# Patient Record
Sex: FEMALE | Race: WHITE | Employment: OTHER | URBAN - METROPOLITAN AREA
[De-identification: names, ages, dates, MRNs, and addresses within clinical notes are randomized per-mention and may not be internally consistent; named-entity substitution may affect disease eponyms.]

---

## 2017-01-10 ENCOUNTER — HOSPITAL ENCOUNTER (OUTPATIENT)
Dept: MAMMOGRAPHY | Age: 62
Discharge: HOME | End: 2017-01-10
Attending: INTERNAL MEDICINE
Payer: COMMERCIAL

## 2017-01-10 DIAGNOSIS — Z12.31 VISIT FOR SCREENING MAMMOGRAM: ICD-10-CM

## 2017-01-10 PROCEDURE — G0202 SCR MAMMO BI INCL CAD: HCPCS

## 2017-01-10 PROCEDURE — 77067 SCR MAMMO BI INCL CAD: CPT

## 2018-11-28 ENCOUNTER — HOSPITAL ENCOUNTER (OUTPATIENT)
Dept: MAMMOGRAPHY | Age: 63
Discharge: HOME | End: 2018-11-28
Attending: SPECIALIST
Payer: COMMERCIAL

## 2018-11-28 DIAGNOSIS — Z12.31 VISIT FOR SCREENING MAMMOGRAM: ICD-10-CM

## 2018-11-28 PROCEDURE — 77063 BREAST TOMOSYNTHESIS BI: CPT

## 2019-04-29 ENCOUNTER — HOSPITAL ENCOUNTER (OUTPATIENT)
Dept: MAMMOGRAPHY | Age: 64
Discharge: HOME | End: 2019-04-29
Payer: COMMERCIAL

## 2019-04-29 DIAGNOSIS — M85.80 OSTEOPENIA: ICD-10-CM

## 2019-04-29 PROCEDURE — 77080 DXA BONE DENSITY AXIAL: CPT

## 2020-06-16 ENCOUNTER — OFFICE VISIT (OUTPATIENT)
Dept: PRIMARY CARE CLINIC | Age: 65
End: 2020-06-16

## 2020-06-16 VITALS — HEART RATE: 64 BPM | OXYGEN SATURATION: 98 % | TEMPERATURE: 99.2 F

## 2020-06-16 DIAGNOSIS — Z01.818 PRE-OP EXAM: Primary | ICD-10-CM

## 2020-06-16 DIAGNOSIS — Z11.59 SPECIAL SCREENING EXAMINATION FOR UNSPECIFIED VIRAL DISEASE: ICD-10-CM

## 2020-06-16 NOTE — PROGRESS NOTES
Patient is being seen at the [facility]. Please see scanned documentation as well for further information. S:  Ms. Dulce Maria Gil presents for pre-op or pre-procedure testing for Covid 19. Patient has procedure or surgery by Dr. Cheo Whitfield scheduled for 6/23/20. Patient denies current Covid type symptoms. O:    Visit Vitals  Pulse 64   Temp 99.2 °F (37.3 °C) (Oral)   SpO2 98%     Alert and oriented  No acute distress, no increased work of breathing  Normocephalic, atraumatic  Skin color normal  Calm and cooperative  Voice clear, conversant without shortness of breath    A/P:  Pre-op, Pre-procedure exam    Covid 19 testing performed  Patient understands she will be contacted if the results are positive. Follow up prn.

## 2020-06-17 LAB — SARS-COV-2, NAA: NOT DETECTED

## 2020-06-23 ENCOUNTER — HOSPITAL ENCOUNTER (OUTPATIENT)
Age: 65
Setting detail: OUTPATIENT SURGERY
Discharge: HOME | End: 2020-06-23
Attending: INTERNAL MEDICINE | Admitting: INTERNAL MEDICINE
Payer: MEDICARE

## 2020-06-23 ENCOUNTER — ANESTHESIA EVENT (OUTPATIENT)
Dept: ENDOSCOPY | Age: 65
End: 2020-06-23
Payer: MEDICARE

## 2020-06-23 ENCOUNTER — ANESTHESIA (OUTPATIENT)
Dept: ENDOSCOPY | Age: 65
End: 2020-06-23
Payer: MEDICARE

## 2020-06-23 VITALS
HEART RATE: 60 BPM | TEMPERATURE: 97.8 F | DIASTOLIC BLOOD PRESSURE: 86 MMHG | SYSTOLIC BLOOD PRESSURE: 146 MMHG | WEIGHT: 120 LBS | RESPIRATION RATE: 11 BRPM | BODY MASS INDEX: 19.29 KG/M2 | OXYGEN SATURATION: 96 % | HEIGHT: 66 IN

## 2020-06-23 PROCEDURE — 76040000007: Performed by: INTERNAL MEDICINE

## 2020-06-23 PROCEDURE — 88305 TISSUE EXAM BY PATHOLOGIST: CPT

## 2020-06-23 PROCEDURE — 74011000250 HC RX REV CODE- 250: Performed by: NURSE ANESTHETIST, CERTIFIED REGISTERED

## 2020-06-23 PROCEDURE — 77030013992 HC SNR POLYP ENDOSC BSC -B: Performed by: INTERNAL MEDICINE

## 2020-06-23 PROCEDURE — 74011250636 HC RX REV CODE- 250/636: Performed by: INTERNAL MEDICINE

## 2020-06-23 PROCEDURE — 77030021593 HC FCPS BIOP ENDOSC BSC -A: Performed by: INTERNAL MEDICINE

## 2020-06-23 PROCEDURE — 74011250637 HC RX REV CODE- 250/637: Performed by: INTERNAL MEDICINE

## 2020-06-23 PROCEDURE — 76060000032 HC ANESTHESIA 0.5 TO 1 HR: Performed by: INTERNAL MEDICINE

## 2020-06-23 PROCEDURE — 74011250636 HC RX REV CODE- 250/636: Performed by: NURSE ANESTHETIST, CERTIFIED REGISTERED

## 2020-06-23 RX ORDER — SODIUM CHLORIDE 9 MG/ML
INJECTION, SOLUTION INTRAVENOUS
Status: DISCONTINUED | OUTPATIENT
Start: 2020-06-23 | End: 2020-06-23 | Stop reason: HOSPADM

## 2020-06-23 RX ORDER — SODIUM CHLORIDE 0.9 % (FLUSH) 0.9 %
5-40 SYRINGE (ML) INJECTION AS NEEDED
Status: DISCONTINUED | OUTPATIENT
Start: 2020-06-23 | End: 2020-06-23 | Stop reason: HOSPADM

## 2020-06-23 RX ORDER — ATROPINE SULFATE 0.1 MG/ML
0.5 INJECTION INTRAVENOUS
Status: DISCONTINUED | OUTPATIENT
Start: 2020-06-23 | End: 2020-06-23 | Stop reason: HOSPADM

## 2020-06-23 RX ORDER — LIDOCAINE HYDROCHLORIDE 20 MG/ML
INJECTION, SOLUTION EPIDURAL; INFILTRATION; INTRACAUDAL; PERINEURAL AS NEEDED
Status: DISCONTINUED | OUTPATIENT
Start: 2020-06-23 | End: 2020-06-23 | Stop reason: HOSPADM

## 2020-06-23 RX ORDER — MIDAZOLAM HYDROCHLORIDE 1 MG/ML
.25-5 INJECTION, SOLUTION INTRAMUSCULAR; INTRAVENOUS
Status: DISCONTINUED | OUTPATIENT
Start: 2020-06-23 | End: 2020-06-23 | Stop reason: HOSPADM

## 2020-06-23 RX ORDER — SODIUM CHLORIDE 9 MG/ML
150 INJECTION, SOLUTION INTRAVENOUS CONTINUOUS
Status: DISCONTINUED | OUTPATIENT
Start: 2020-06-23 | End: 2020-06-23 | Stop reason: HOSPADM

## 2020-06-23 RX ORDER — PROPOFOL 10 MG/ML
INJECTION, EMULSION INTRAVENOUS AS NEEDED
Status: DISCONTINUED | OUTPATIENT
Start: 2020-06-23 | End: 2020-06-23 | Stop reason: HOSPADM

## 2020-06-23 RX ORDER — EPINEPHRINE 0.1 MG/ML
1 INJECTION INTRACARDIAC; INTRAVENOUS
Status: DISCONTINUED | OUTPATIENT
Start: 2020-06-23 | End: 2020-06-23 | Stop reason: HOSPADM

## 2020-06-23 RX ORDER — DIPHENHYDRAMINE HYDROCHLORIDE 50 MG/ML
INJECTION, SOLUTION INTRAMUSCULAR; INTRAVENOUS AS NEEDED
Status: DISCONTINUED | OUTPATIENT
Start: 2020-06-23 | End: 2020-06-23 | Stop reason: HOSPADM

## 2020-06-23 RX ORDER — LORAZEPAM 1 MG/1
1 TABLET ORAL
COMMUNITY

## 2020-06-23 RX ORDER — SODIUM CHLORIDE 0.9 % (FLUSH) 0.9 %
5-40 SYRINGE (ML) INJECTION EVERY 8 HOURS
Status: DISCONTINUED | OUTPATIENT
Start: 2020-06-23 | End: 2020-06-23 | Stop reason: HOSPADM

## 2020-06-23 RX ORDER — DIPHENHYDRAMINE HYDROCHLORIDE 50 MG/ML
INJECTION, SOLUTION INTRAMUSCULAR; INTRAVENOUS
Status: COMPLETED
Start: 2020-06-23 | End: 2020-06-23

## 2020-06-23 RX ORDER — FENTANYL CITRATE 50 UG/ML
200 INJECTION, SOLUTION INTRAMUSCULAR; INTRAVENOUS
Status: DISCONTINUED | OUTPATIENT
Start: 2020-06-23 | End: 2020-06-23 | Stop reason: HOSPADM

## 2020-06-23 RX ORDER — DEXTROMETHORPHAN/PSEUDOEPHED 2.5-7.5/.8
1.2 DROPS ORAL
Status: DISCONTINUED | OUTPATIENT
Start: 2020-06-23 | End: 2020-06-23 | Stop reason: HOSPADM

## 2020-06-23 RX ADMIN — DIPHENHYDRAMINE HYDROCHLORIDE 25 MG: 50 INJECTION, SOLUTION INTRAMUSCULAR; INTRAVENOUS at 11:06

## 2020-06-23 RX ADMIN — SODIUM CHLORIDE: 900 INJECTION, SOLUTION INTRAVENOUS at 10:44

## 2020-06-23 RX ADMIN — LIDOCAINE HYDROCHLORIDE 100 MG: 20 INJECTION, SOLUTION EPIDURAL; INFILTRATION; INTRACAUDAL; PERINEURAL at 10:47

## 2020-06-23 RX ADMIN — PROPOFOL 300 MG: 10 INJECTION, EMULSION INTRAVENOUS at 11:18

## 2020-06-23 NOTE — ANESTHESIA PREPROCEDURE EVALUATION
Relevant Problems   No relevant active problems       Anesthetic History   No history of anesthetic complications            Review of Systems / Medical History  Patient summary reviewed, nursing notes reviewed and pertinent labs reviewed    Pulmonary  Within defined limits                 Neuro/Psych         Psychiatric history     Cardiovascular            Dysrhythmias : SVT  Hyperlipidemia  Pertinent negatives: No angina  Exercise tolerance: >4 METS     GI/Hepatic/Renal  Within defined limits              Endo/Other  Within defined limits           Other Findings            Physical Exam    Airway  Mallampati: I  TM Distance: 4 - 6 cm  Neck ROM: normal range of motion   Mouth opening: Normal     Cardiovascular  Regular rate and rhythm,  S1 and S2 normal,  no murmur, click, rub, or gallop             Dental  No notable dental hx       Pulmonary  Breath sounds clear to auscultation               Abdominal  Abdominal exam normal       Other Findings            Anesthetic Plan    ASA: 2  Anesthesia type: MAC          Induction: Intravenous  Anesthetic plan and risks discussed with: Patient

## 2020-06-23 NOTE — H&P
[address]  [address] [facility], [address] [place] [address]          Pre-procedure History and Physical       NAME:  Frankie Cooney   :   1955   MRN:   [id number]     CHIEF COMPLAINT/HPI: See procedure note    PMH:  Past Medical History:   Diagnosis Date    Hyperlipidemia 2011    OCD (obsessive compulsive disorder) 2011    PSVT (paroxysmal supraventricular tachycardia) ([address] 2011       PSH:  Past Surgical History:   Procedure Laterality Date    HX BREAST BIOPSY Right ?    benign    HX  SECTION         Allergies: Allergies   Allergen Reactions    Pcn [Penicillins] Rash    Sulfa (Sulfonamide Antibiotics) Rash       Home Medications:  Prior to Admission Medications   Prescriptions Last Dose Informant Patient Reported? Taking? Cholecalciferol, Vitamin D3, (VITAMIN D3) 1,000 unit cap 2020 at Unknown time  Yes Yes   Sig: Take 2,000 Units by mouth daily. LORazepam (ATIVAN) 1 mg tablet 2020 at Unknown time  Yes Yes   Sig: Take 1 mg by mouth every four (4) hours as needed for Anxiety. MULTIVIT WITH CALCIUM,IRON,MIN (WOMEN'S DAILY MULTIVITAMIN PO) 2020 at Unknown time  Yes Yes   Sig: Take 1 tablet by mouth daily. PARoxetine (PAXIL) 20 mg tablet 2020 at Unknown time  No Yes   Sig: TAKE ONE AND ONE-HALF TABLET BY MOUTH DAILY   aspirin delayed-release 81 mg tablet 2020 at Unknown time  Yes Yes   Sig: Take 162 mg by mouth daily. diazePAM (VALIUM) 5 mg tablet Not Taking at Unknown time  No No   Sig: TAKE ONE TABLET BY MOUTH EVERY EVENING   digoxin (LANOXIN) 0.125 mg tablet 2020 at Unknown time  No Yes   Sig: TAKE ONE TABLET BY MOUTH DAILY   fish oil-dha-epa 1,200-144-216 mg cap 2020 at Unknown time  Yes Yes   Sig: Take 1 tablet by mouth four (4) times daily.    simvastatin (ZOCOR) 20 mg tablet 2020 at Unknown time  No Yes   Sig: TAKE ONE TABLET BY MOUTH EVERY NIGHT AT BEDTIME   timolol maleate 5 mg tablet 2020 at Unknown time  No Yes   Sig: TAKE ONE TABLET BY MOUTH DAILY      Facility-Administered Medications: None       Hospital Medications:  Current Facility-Administered Medications   Medication Dose Route Frequency    0.9% sodium chloride infusion  150 mL/hr IntraVENous CONTINUOUS    sodium chloride (NS) flush 5-40 mL  5-40 mL IntraVENous Q8H    sodium chloride (NS) flush 5-40 mL  5-40 mL IntraVENous PRN    midazolam (VERSED) injection 0.25-5 mg  0.25-5 mg IntraVENous Multiple    fentaNYL citrate (PF) injection 200 mcg  200 mcg IntraVENous Multiple    simethicone (MYLICON) 01UA/8.0GP oral drops 80 mg  1.2 mL Oral Multiple    atropine injection 0.5 mg  0.5 mg IntraVENous ONCE PRN    EPINEPHrine (ADRENALIN) 0.1 mg/mL syringe 1 mg  1 mg Endoscopically ONCE PRN     Facility-Administered Medications Ordered in Other Encounters   Medication Dose Route Frequency    lidocaine (PF) (XYLOCAINE) 20 mg/mL (2 %) injection   IntraVENous PRN       Family History:  History reviewed. No pertinent family history. Social History:  Social History     Tobacco Use    Smoking status: Former Smoker   Substance Use Topics    Alcohol use: Yes       The patient was counseled at length about the risks of brandie Covid-19 in the tenzin-operative and post-operative states including the recovery window of their procedure. The patient was made aware that brandie Covid-19 after a surgical procedure may worsen their prognosis for recovering from the virus and lend to a higher morbidity and or mortality risk. The patient was given the options of postponing their procedure. All of the risks, benefits, and alternatives were discussed. The patient does  wish to proceed with the procedure. PHYSICAL EXAM PRIOR TO SEDATION:  General: Alert, in no acute distress    Lungs:            CTA bilaterally  Heart:  Normal S1, S2    Abdomen: Soft, Non distended, Non tender. Normoactive bowel sounds.       Assessment:   Stable for sedation administration.   Date of last colonoscopy: 11 yrs, Polyps  No    Plan:     · Endoscopic procedure with sedation     Signed By: Liborio Holman MD     6/23/2020  10:48 AM

## 2020-06-23 NOTE — PERIOP NOTES

## 2020-06-23 NOTE — ROUTINE PROCESS
Elías   1955  [id number]    Situation:  Verbal report received from: Ana María  Procedure: Procedure(s):  COLONOSCOPY,EGD  ESOPHAGOGASTRODUODENOSCOPY (EGD) :-  ESOPHAGOGASTRODUODENAL (EGD) BIOPSY  ENDOSCOPIC POLYPECTOMY    Background:    Preoperative diagnosis: SCREENING,  GERD  Postoperative diagnosis: 1.gastritis  2.esophageal polyp  3. cecal polyps x 2    :  Dr. Gagan Benjamin  Assistant(s): Endoscopy Technician-1: Yesi Cleveland  Endoscopy RN-1: Jeanette Franco    Specimens:   ID Type Source Tests Collected by Time Destination   1 : duodenal bx r/o celiac Preservative Duodenum  Kendell Garcia MD 6/23/2020 1052 Pathology   2 : gastric bx r/o h pylori Preservative Gastric  Kendell Garcia MD 6/23/2020 1054 Pathology   3 : esophageal polyp Preservative   Kendell Garcia MD 6/23/2020 1056 Pathology   4 : cecal polyps Preservative Cecum  Kendell Garcia MD 6/23/2020 1108 Pathology     H. Pylori      Assessment:  Intra-procedure medications     Anesthesia gave intra-procedure sedation and medications, see anesthesia flow sheet yes    Intravenous fluids: NS@ KVO     Vital signs stable yes    Abdominal assessment: round and soft yes    Recommendation:  Discharge patient per MD order yes  Return to floor  Family or Friend yes  Permission to share finding with family or friend yes

## 2020-06-23 NOTE — PROCEDURES
Sin Khan Jared Guerra M.D.  [address] [place] [address]  [phone]               Colonoscopy Procedure Note    NAME: Jimmy Hoyos  :  1955  MRN:  [id number]    Indications:   Screening colonoscopy     : Angelkia Iglesias MD    Referring Provider:  Matthew Hurtado MD    Surgical Assistant: none    Prosthetic devices, grafts, tissues, transplant, or devices implanted: none    Medicines:  MAC anesthesia      Procedure Details:  After informed consent was obtained with all risks and benefits of the procedure explained and preprocedure exam completed, the patient was placed in the left lateral decubitus position. Universal protocol for patient identification was performed and documented in the nursing notes. Throughout the procedure, the patient's blood pressure was monitored at least every five minutes; pulse, and oxygen saturations were monitored continuously. All vital signs were documented in the nursing notes. A digital rectal exam was performed and was normal.  The Olympus videocolonoscope  was inserted in the rectum and carefully advanced to the cecum, which was identified by the ileocecal valve and appendiceal orifice. The colonoscope was slowly withdrawn with careful evaluation between folds. Retroflexion in the rectum was performed; findings and interventions are described below. Procedure start time, extent reached time/cecum time, and procedure end time are documented in the nursing notes. The quality of preparation was good.        Findings:   Rectum: normal  Sigmoid: normal  Descending Colon: normal  Transverse Colon: normal  Ascending Colon: normal  Cecum: 2  Sessile polyp(s), the largest 5 mm in size; s/p cold snare polypectomy    Interventions:    2 complete polypectomy were performed using cold snare  and the polyps were  retrieved    Specimens:   ID Type Source Tests Collected by Time Destination   1 : duodenal bx r/o celiac Preservative Duodenum  Ronald Mc MD 6/23/2020 1052 Pathology   2 : gastric bx r/o h pylori Preservative Gastric  Ronald Mc MD 6/23/2020 1054 Pathology   3 : esophageal polyp Preservative   Ronald Mc MD 6/23/2020 1056 Pathology   4 : cecal polyps Preservative Cecum  Ronald Mc MD 6/23/2020 1108 Pathology       EBL:  None. Complications:   No immediate complications     Impression:  -Benign appearing colon polyps, removed as above. Recommendations:   -If adenoma is present, repeat colonoscopy in 5 years. If < 10 years, reason:  above average risk patient    Resume normal medication(s). Signed by:  Deepali Monson MD          6/23/2020  11:35 AM

## 2020-06-23 NOTE — PROCEDURES
[facility] [address] Taras Veloz M.D.  [place], [facility]  [phone]               Esophagogastroduodenoscopy (EGD) Procedure Note    NAME: Leanne Habermann  :  1955  MRN:  [id number]    Indications:  GERD; dyspepsia     : Polly Valdez MD    Referring Provider:  Constance Triplett MD    Surgical Assistant: none    Prosthetic devices, grafts, tissues, transplant, or devices implanted: none    Medicine:  MAC anesthesia      Procedure Details:  After informed consent was obtained with all risks and benefits of the procedure explained and preprocedure exam completed, the patient was placed in the left lateral decubitus position. Universal protocol for patient identification was performed and documented in the nursing notes. Throughout the procedure, the patient's blood pressure was monitored at least every five minutes; pulse, and oxygen saturations were monitored continuously. All vital signs were documented in the nursing notes. The endoscope was inserted into the mouth and advanced under direct vision to second portion of the duodenum. A careful inspection was made as the gastroscope was withdrawn, including a retroflexed view of the proximal stomach; findings and interventions are described below.       Findings:   Esophagus: 4 mm white polyp in the distal esophagus s/p cold forceps polypectomy  Stomach: mild linear erythema in the antrum s/p biopsies throughout the stomach  Duodenum: normal s/p biopsies for celiac disease    Interventions:    biopsy of esophagus, stomach, and duodenum    Specimens:   ID Type Source Tests Collected by Time Destination   1 : duodenal bx r/o celiac Preservative Duodenum  Jan Drew MD 2020 1052 Pathology   2 : gastric bx r/o h pylori Preservative Gastric  Jan Drew MD 2020 1054 Pathology   3 : esophageal polyp Preservative   Jan Drew MD 2020 1056 Pathology   4 : cecal polyps Preservative Cecum  Yudelka Rose MD 6/23/2020 1108 Pathology        EBL: None          Complications:     No immediate complications        Impression:  -See post-procedure diagnoses. Recommendations:  -Await pathology. Signed by:  Mimi Garcia MD         6/23/2020 11:30 AM

## 2020-06-23 NOTE — ANESTHESIA POSTPROCEDURE EVALUATION
Post-Anesthesia Evaluation and Assessment    Patient: Arcadio Manzo MRN: [id number]  SSN: [id number]    YOB: 1955  Age: 71 y.o. Sex: female      I have evaluated the patient and they are stable and ready for discharge from the PACU. Cardiovascular Function/Vital Signs  Visit Vitals  /86   Pulse 60   Temp 36.6 °C (97.8 °F)   Resp 11   Ht 5' 6\" (1.676 m)   Wt 54.4 kg (120 lb)   SpO2 96%   Breastfeeding No   BMI 19.37 kg/m²       Patient is status post MAC anesthesia for Procedure(s):  COLONOSCOPY,EGD  ESOPHAGOGASTRODUODENOSCOPY (EGD) :-  ESOPHAGOGASTRODUODENAL (EGD) BIOPSY  ENDOSCOPIC POLYPECTOMY. Nausea/Vomiting: None    Postoperative hydration reviewed and adequate. Pain:  Pain Scale 1: Visual (06/23/20 1134)  Pain Intensity 1: 0 (06/23/20 1134)   Managed    Neurological Status: At baseline    Mental Status, Level of Consciousness: Alert and  oriented to person, place, and time    Pulmonary Status:   O2 Device: Room air (06/23/20 1134)   Adequate oxygenation and airway patent    Complications related to anesthesia: None    Post-anesthesia assessment completed. No concerns    Signed By: Deysi Bell MD     June 23, 2020              Procedure(s):  COLONOSCOPY,EGD  ESOPHAGOGASTRODUODENOSCOPY (EGD) :-  ESOPHAGOGASTRODUODENAL (EGD) BIOPSY  ENDOSCOPIC POLYPECTOMY. MAC    <[email or web address]>    INITIAL Post-op Vital signs:   Vitals Value Taken Time   BP 0/0 6/23/2020 11:54 AM   Temp 36.6 °C (97.8 °F) 6/23/2020 11:23 AM   Pulse 0 6/23/2020 11:55 AM   Resp 0 6/23/2020 12:07 PM   SpO2 0 % 6/23/2020 11:55 AM   Vitals shown include unvalidated device data.

## 2020-06-23 NOTE — DISCHARGE INSTRUCTIONS
[place] [address] Tosha Summers M.D.  [address] [place] [address], [address] [place] [address]  [phone]                      EGD/COLONOSCOPY DISCHARGE INSTRUCTIONS    Jeremias Garcia  [id number]  1955    DISCOMFORT:  Redness at IV site- apply warm compress to area; if redness or soreness persist- contact your physician  There may be a slight amount of blood passed from the rectum  Gaseous discomfort- walking, belching will help relieve any discomfort  You may not operate a vehicle for 12 hours  You may not  engage in an occupation involving machinery or appliances for rest of today  You may not  drink alcoholic beverages for at least 12 hours  Avoid making any critical decisions for at least 24 hour    DIET:   You may resume your normal diet, but some patients find that heavy or large  meals may lead to indigestion or vomiting. I suggest a light meal as first food  Intake. I recommend a whole food, plant-based diet for your overall health. ACTIVITY:  You may resume your normal daily activities. It is recommended that you spend the remainder of the day resting - avoid any strenuous activity. CALL M.D. ANY SIGN OF   Increasing pain, nausea, vomiting  Abdominal distension (swelling)  New increased bleeding (oral or rectal)  Fever (chills)  Pain in chest area  Bloody discharge from nose or mouth  Shortness of breath    Additional Instructions:   Call Dr. Tosha Summers if any questions or problems at [phone]   Setup follow-up office visit in 4 weeks   Should have a repeat colonoscopy in 5-10 years based on pathology. EGD showed small esophagus polyp removed and mild stomach redness. Colonoscopy showed 2 small polyps removed. PPI daily. Pepcid 20 mg BID. Learning About Coronavirus [phone])  Coronavirus [phone]): Overview  What is coronavirus (COVID-19)? The coronavirus disease (COVID-19) is caused by a virus.  It is an illness that was first found in [place], [place], in December 2019. It has since spread worldwide. The virus can cause fever, cough, and trouble breathing. In severe cases, it can cause pneumonia and make it hard to breathe without help. It can cause death. Coronaviruses are a large group of viruses. They cause the common cold. They also cause more serious illnesses like Middle East respiratory syndrome (MERS) and severe acute respiratory syndrome (SARS). COVID-19 is caused by a novel coronavirus. That means it's a new type that has not been seen in people before. This virus spreads person-to-person through droplets from coughing and sneezing. It can also spread when you are close to someone who is infected. And it can spread when you touch something that has the virus on it, such as a doorknob or a tabletop. What can you do to protect yourself from coronavirus (COVID-19)? The best way to protect yourself from getting sick is to:  · Avoid areas where there is an outbreak. · Avoid contact with people who may be infected. · Wash your hands often with soap or alcohol-based hand sanitizers. · Avoid crowds and try to stay at least 6 feet away from other people. · Wash your hands often, especially after you cough or sneeze. Use soap and water, and scrub for at least 20 seconds. If soap and water aren't available, use an alcohol-based hand . · Avoid touching your mouth, nose, and eyes. What can you do to avoid spreading the virus to others? To help avoid spreading the virus to others:  · Cover your mouth with a tissue when you cough or sneeze. Then throw the tissue in the trash. · Use a disinfectant to clean things that you touch often. · Stay home if you are sick or have been exposed to the virus. Don't go to school, work, or public areas. And don't use public transportation. · If you are sick:  ? Leave your home only if you need to get medical care. But call the doctor's office first so they know you're coming. And wear a face mask, if you have one.  ?  If you have a face mask, wear it whenever you're around other people. It can help stop the spread of the virus when you cough or sneeze. ? Clean and disinfect your home every day. Use household  and disinfectant wipes or sprays. Take special care to clean things that you grab with your hands. These include doorknobs, remote controls, phones, and handles on your refrigerator and microwave. And don't forget countertops, tabletops, bathrooms, and computer keyboards. When to call for help  Call 911 anytime you think you may need emergency care. For example, call if:  · You have severe trouble breathing. (You can't talk at all.)  · You have constant chest pain or pressure. · You are severely dizzy or lightheaded. · You are confused or can't think clearly. · Your face and lips have a blue color. · You pass out (lose consciousness) or are very hard to wake up. Call your doctor now if you develop symptoms such as:  · Shortness of breath. · Fever. · Cough. If you need to get care, call ahead to the doctor's office for instructions before you go. Make sure you wear a face mask, if you have one, to prevent exposing other people to the virus. Where can you get the latest information? The following health organizations are tracking and studying this virus. Their websites contain the most up-to-date information. Tessa Mary Carmen also learn what to do if you think you may have been exposed to the virus. · U.S. Centers for Disease Control and Prevention (CDC): The CDC provides updated news about the disease and travel advice. The website also tells you how to prevent the spread of infection. www.cdc.gov  · World Health Organization [facility]): WHO offers information about the virus outbreaks. WHO also has travel advice. www.who.int  Current as of: April 1, 2020               Content Version: 12.4  © 9804-4770 Healthwise, Incorporated.    Care instructions adapted under license by your healthcare professional. If you have questions about a medical condition or this instruction, always ask your healthcare professional. [place] [address] any warranty or liability for your use of this information.

## 2020-06-25 ENCOUNTER — HOSPITAL ENCOUNTER (EMERGENCY)
Age: 65
Discharge: HOME | End: 2020-06-25
Attending: STUDENT IN AN ORGANIZED HEALTH CARE EDUCATION/TRAINING PROGRAM | Admitting: EMERGENCY MEDICINE
Payer: MEDICARE

## 2020-06-25 ENCOUNTER — APPOINTMENT (OUTPATIENT)
Dept: GENERAL RADIOLOGY | Age: 65
End: 2020-06-25
Attending: STUDENT IN AN ORGANIZED HEALTH CARE EDUCATION/TRAINING PROGRAM
Payer: MEDICARE

## 2020-06-25 VITALS
OXYGEN SATURATION: 99 % | DIASTOLIC BLOOD PRESSURE: 79 MMHG | RESPIRATION RATE: 16 BRPM | SYSTOLIC BLOOD PRESSURE: 155 MMHG | TEMPERATURE: 98.8 F | HEART RATE: 57 BPM

## 2020-06-25 DIAGNOSIS — R07.89 OTHER CHEST PAIN: Primary | ICD-10-CM

## 2020-06-25 LAB
ALBUMIN SERPL-MCNC: 3.9 G/DL (ref 3.5–5)
ALBUMIN/GLOB SERPL: 1.1 {RATIO} (ref 1.1–2.2)
ALP SERPL-CCNC: 112 U/L (ref 45–117)
ALT SERPL-CCNC: 35 U/L (ref 12–78)
ANION GAP SERPL CALC-SCNC: 7 MMOL/L (ref 5–15)
AST SERPL-CCNC: 17 U/L (ref 15–37)
BASOPHILS # BLD: 0 K/UL (ref 0–0.1)
BASOPHILS NFR BLD: 0 % (ref 0–1)
BILIRUB SERPL-MCNC: 0.4 MG/DL (ref 0.2–1)
BUN SERPL-MCNC: 13 MG/DL (ref 6–20)
BUN/CREAT SERPL: 17 (ref 12–20)
CALCIUM SERPL-MCNC: 9.1 MG/DL (ref 8.5–10.1)
CHLORIDE SERPL-SCNC: 104 MMOL/L (ref 97–108)
CO2 SERPL-SCNC: 25 MMOL/L (ref 21–32)
COMMENT, HOLDF: NORMAL
CREAT SERPL-MCNC: 0.77 MG/DL (ref 0.55–1.02)
DIFFERENTIAL METHOD BLD: NORMAL
EOSINOPHIL # BLD: 0.2 K/UL (ref 0–0.4)
EOSINOPHIL NFR BLD: 2 % (ref 0–7)
ERYTHROCYTE [DISTWIDTH] IN BLOOD BY AUTOMATED COUNT: 12.3 % (ref 11.5–14.5)
GLOBULIN SER CALC-MCNC: 3.6 G/DL (ref 2–4)
GLUCOSE SERPL-MCNC: 142 MG/DL (ref 65–100)
HCT VFR BLD AUTO: 36.8 % (ref 35–47)
HGB BLD-MCNC: 12.4 G/DL (ref 11.5–16)
IMM GRANULOCYTES # BLD AUTO: 0 K/UL (ref 0–0.04)
IMM GRANULOCYTES NFR BLD AUTO: 0 % (ref 0–0.5)
LYMPHOCYTES # BLD: 3.2 K/UL (ref 0.8–3.5)
LYMPHOCYTES NFR BLD: 33 % (ref 12–49)
MCH RBC QN AUTO: 31.7 PG (ref 26–34)
MCHC RBC AUTO-ENTMCNC: 33.7 G/DL (ref 30–36.5)
MCV RBC AUTO: 94.1 FL (ref 80–99)
MONOCYTES # BLD: 0.7 K/UL (ref 0–1)
MONOCYTES NFR BLD: 7 % (ref 5–13)
NEUTS SEG # BLD: 5.7 K/UL (ref 1.8–8)
NEUTS SEG NFR BLD: 58 % (ref 32–75)
NRBC # BLD: 0 K/UL (ref 0–0.01)
NRBC BLD-RTO: 0 PER 100 WBC
PLATELET # BLD AUTO: 264 K/UL (ref 150–400)
PMV BLD AUTO: 9.3 FL (ref 8.9–12.9)
POTASSIUM SERPL-SCNC: 4 MMOL/L (ref 3.5–5.1)
PROT SERPL-MCNC: 7.5 G/DL (ref 6.4–8.2)
RBC # BLD AUTO: 3.91 M/UL (ref 3.8–5.2)
SAMPLES BEING HELD,HOLD: NORMAL
SODIUM SERPL-SCNC: 136 MMOL/L (ref 136–145)
TROPONIN I SERPL-MCNC: <0.05 NG/ML
WBC # BLD AUTO: 9.9 K/UL (ref 3.6–11)

## 2020-06-25 PROCEDURE — 99284 EMERGENCY DEPT VISIT MOD MDM: CPT

## 2020-06-25 PROCEDURE — 80053 COMPREHEN METABOLIC PANEL: CPT

## 2020-06-25 PROCEDURE — 71046 X-RAY EXAM CHEST 2 VIEWS: CPT

## 2020-06-25 PROCEDURE — 85025 COMPLETE CBC W/AUTO DIFF WBC: CPT

## 2020-06-25 PROCEDURE — 84484 ASSAY OF TROPONIN QUANT: CPT

## 2020-06-25 PROCEDURE — 93005 ELECTROCARDIOGRAM TRACING: CPT

## 2020-06-25 NOTE — ED NOTES
7: 07 AM  Change of shift. Care of patient taken over from Dr Hali Salas; H&P reviewed, bedside handoff complete. Awaiting Labs/ ; Pt has no c/o currently;     7:39 AM CP- cardiology follow-up; PCP already scheduled later thyis week;   [facility]  results have been reviewed with her. She has been counseled regarding her diagnosis. She verbally conveys understanding and agreement of the signs, symptoms, diagnosis, treatment and prognosis and additionally agrees to Call/ Arrange follow up as recommended with Dr. Jadiel Hansen MD in 24 - 48 hours. She also agrees with the care-plan and conveys that all of her questions have been answered. I have also put together some discharge instructions for her that include: 1) educational information regarding their diagnosis, 2) how to care for their diagnosis at home, as well a 3) list of reasons why they would want to return to the ED prior to their follow-up appointment, should their condition change or for concerns.

## 2020-06-25 NOTE — DISCHARGE INSTRUCTIONS
Patient Education        Chest Pain: Care Instructions  Your Care Instructions     There are many things that can cause chest pain. Some are not serious and will get better on their own in a few days. But some kinds of chest pain need more testing and treatment. Your doctor may have recommended a follow-up visit in the next 8 to 12 hours. If you are not getting better, you may need more tests or treatment. Even though your doctor has released you, you still need to watch for any problems. The doctor carefully checked you, but sometimes problems can develop later. If you have new symptoms or if your symptoms do not get better, get medical care right away. If you have worse or different chest pain or pressure that lasts more than 5 minutes or you passed out (lost consciousness), [id number] or seek other emergency help right away. A medical visit is only one step in your treatment. Even if you feel better, you still need to do what your doctor recommends, such as going to all suggested follow-up appointments and taking medicines exactly as directed. This will help you recover and help prevent future problems. How can you care for yourself at home? · Rest until you feel better. · Take your medicine exactly as prescribed. Call your doctor if you think you are having a problem with your medicine. · Do not drive after taking a prescription pain medicine. When should you call for help? [id number]:   · You passed out (lost consciousness). · You have severe difficulty breathing. · You have symptoms of a heart attack. These may include:  ? Chest pain or pressure, or a strange feeling in your chest.  ? Sweating. ? Shortness of breath. ? Nausea or vomiting. ? Pain, pressure, or a strange feeling in your back, neck, jaw, or upper belly or in one or both shoulders or arms. ? Lightheadedness or sudden weakness. ? A fast or irregular heartbeat.   After you call 911, the  may tell you to chew 1 adult-strength or 2 to 4 low-dose aspirin. Wait for an ambulance. Do not try to drive yourself. Call your doctor today if:   · You have any trouble breathing. · Your chest pain gets worse. · You are dizzy or lightheaded, or you feel like you may faint. · You are not getting better as expected. · You are having new or different chest pain. Where can you learn more? Go to http://michelle-angus.info/  Enter A120 in the search box to learn more about \"Chest Pain: Care Instructions. \"  Current as of: June 26, 2019               Content Version: 12.5  © 1948-2398 Healthwise, Incorporated. Care instructions adapted under license by [employer] (which disclaims liability or warranty for this information). If you have questions about a medical condition or this instruction, always ask your healthcare professional. [address] any warranty or liability for your use of this information.

## 2020-06-25 NOTE — ED PROVIDER NOTES
Chest Pain    This is a recurrent problem. The current episode started 1 to 2 hours ago. The problem has been gradually worsening. Duration of episode(s) is 2 hours. The problem occurs every several days. The pain is associated with normal activity and rest. The pain is present in the right side. The pain is at a severity of 2/10. The pain is mild. The quality of the pain is described as burning. The pain radiates to the upper back. Exacerbated by: nothing. Pertinent negatives include no abdominal pain, no cough, no fever, no leg pain, no shortness of breath and no vomiting. She has tried nothing for the symptoms. Past Medical History:   Diagnosis Date    Hyperlipidemia 2011    OCD (obsessive compulsive disorder) 2011    [facility] 2011       Past Surgical History:   Procedure Laterality Date    COLONOSCOPY Left 2020    COLONOSCOPY,EGD performed by Jim King MD at [facility] END[facility]COPY    HX BREAST BIOPSY Right ?    benign    HX  SECTION           History reviewed. No pertinent family history. Social History     Socioeconomic History    Marital status:      Spouse name: Not on file    Number of children: Not on file    Years of education: Not on file    Highest education level: Not on file   Occupational History    Not on file   Social Needs    Financial resource strain: Not on file    Food insecurity     Worry: Not on file     Inability: Not on file    Transportation needs     Medical: Not on file     Non-medical: Not on file   Tobacco Use    Smoking status: Former Smoker   Substance and Sexual Activity    Alcohol use: Yes     Comment: rarely    Drug use: Yes     Types: Marijuana     Comment: last used 3 weeks ago.      Sexual activity: Not on file   Lifestyle    Physical activity     Days per week: Not on file     Minutes per session: Not on file    Stress: Not on file   Relationships    Social connections Talks on phone: Not on file     Gets together: Not on file     Attends Protestant service: Not on file     Active member of club or organization: Not on file     Attends meetings of clubs or organizations: Not on file     Relationship status: Not on file    Intimate partner violence     Fear of current or ex partner: Not on file     Emotionally abused: Not on file     Physically abused: Not on file     Forced sexual activity: Not on file   Other Topics Concern    Not on file   Social History Narrative    Not on file         ALLERGIES: Pcn [penicillins] and Sulfa (sulfonamide antibiotics)    Review of Systems   Constitutional: Negative for fever. Respiratory: Negative for cough and shortness of breath. Cardiovascular: Positive for chest pain. Gastrointestinal: Negative for abdominal pain and vomiting. Skin: Negative for rash. All other systems reviewed and are negative. Vitals:    06/25/20 0639   BP: 155/79   Pulse: (!) 57   Resp: 16   Temp: 98.8 °F (37.1 °C)   SpO2: 99%            Physical Exam  Vitals signs and nursing note reviewed. Constitutional:       General: She is not in acute distress. Appearance: She is well-developed. HENT:      Head: Normocephalic and atraumatic. Eyes:      Conjunctiva/sclera: Conjunctivae normal.   Neck:      Musculoskeletal: Normal range of motion and neck supple. Cardiovascular:      Rate and Rhythm: Normal rate and regular rhythm. Pulmonary:      Effort: Pulmonary effort is normal. No respiratory distress. Chest:      Chest wall: No tenderness (No crepitus). Abdominal:      Palpations: Abdomen is soft. Tenderness: There is no abdominal tenderness. There is no guarding. Musculoskeletal: Normal range of motion. Skin:     General: Skin is warm and dry. Neurological:      Mental Status: She is alert and oriented to person, place, and time. Motor: No abnormal muscle tone.           MDM       Procedures      EKG interpretation: 6:34  Rhythm: normal sinus rhythm; and regular . Rate (approx.): 65; Axis: normal; Intervals: normal ; ST/T wave: normal, no STEMI; EKG documented and interpreted by Srinivas Garay MD, ED MD.         7:04 AM  Change of shift. Care of patient signed over to Dr. Juani Churchill. Handoff complete.

## 2020-06-25 NOTE — ED NOTES
Pt reports starting pepcid yesterday for new diagnosis of gerd.  Pt had colonoscopy and endoscopy done yesterday at this facility

## 2020-06-25 NOTE — ED TRIAGE NOTES
Patient arrives from home with CC of right sided chest pain that radiates to the back and neck. Pt states she woke up with the pain this morning and was sweaty. Denies shortness of breath, denies fevers, denies sick contacts. Patient had a negative COVID19 test on 6/16 for an endoscopy.

## 2020-06-26 LAB
ATRIAL RATE: 65 BPM
CALCULATED P AXIS, ECG09: 50 DEGREES
CALCULATED R AXIS, ECG10: 25 DEGREES
CALCULATED T AXIS, ECG11: 23 DEGREES
DIAGNOSIS, 93000: NORMAL
P-R INTERVAL, ECG05: 168 MS
Q-T INTERVAL, ECG07: 404 MS
QRS DURATION, ECG06: 86 MS
QTC CALCULATION (BEZET), ECG08: 420 MS
VENTRICULAR RATE, ECG03: 65 BPM

## 2020-07-10 NOTE — PROGRESS NOTES
[place]: Jenny Gambino  [phone]) [phone]    HPI:   Ms. [employer] is a 72 yo F with h/o reported paroxsymal SVT, had abnormal stress test in 1993 due to breast attenuation. Cardiac cath at that time noted no CAD. Reported question of mitral valve prolapse but echo studies have not confirmed this. Seen by cardiology Dr. Elisabet Malloy in past.     She is here now due to episodes of chest discomfort. This can feel like a burning sensation from her chest to her back area lasting for several minutes at a time. She went to the emergency room back in June. She also saw GI and had an EGD on 06/23/2020 and she was told she had gastroesophageal reflux disease. She does note this chest discomfort is nonexertional.  It can happen, sometimes she wakes up with this, it can last for several minutes at a time. One time she was with her friend and they called the rescue squad and workup in the emergency room was unrevealing. There are some occasions where this radiates to her neck. Throughout these spells, she has never had shortness of breath. She just has rare palpitations. No lightheadedness or dizziness. Soc hx. Quit age 27 yo, 1ppd. I care for daughter who has similar symptoms. Fam hx. No early CAD. Assessment and Plan:    1. Chest pain. No obvious cardiac contribution. She does have a history of possible mitral valve prolapse and will obtain an echocardiogram for further evaluation. She had recent EGD and colonoscopy. Recommend she follow up with GI and primary care physician as well, as this could be GI or musculoskeletal.  It is not consistent with ischemic heart disease and no ischemic workup is indicated. If it was normal she can continue to follow here on an as needed basis. 2. Paroxysmal supraventricular tachycardia. Continues with palpitations. No changes made. 3. Bradycardia. Asymptomatic. 4. Mixed hyperlipidemia. Tolerating statin. 5. History of tobacco use.        She  has a past medical history of Hyperlipidemia (7/30/2011), OCD (obsessive compulsive disorder) (7/30/2011), and PSVT (paroxysmal supraventricular tachycardia) ([facility] (7/30/2011). All other systems negative except as above. PE  Vitals:    07/13/20 1053   BP: 120/60   Pulse: 66   Resp: 16   SpO2: 97%   Weight: 118 lb 6.4 oz (53.7 kg)   Height: 5' 6\" (1.676 m)    Body mass index is 19.11 kg/m².    General appearance - alert, well appearing, and in no distress  Mental status - affect appropriate to mood  Eyes - sclera anicteric, moist mucous membranes  Neck - supple, no significant adenopathy, no JVD  Chest - clear to auscultation, no wheezes, rales or rhonchi  Heart - normal rate, regular rhythm, normal S1, S2, no murmurs, rubs, clicks or gallops  Abdomen - soft, nontender, nondistended, no masses or organomegaly  Neurological -no focal deficit  Extremities - peripheral pulses normal, no pedal edema      Recent Labs:  Lab Results   Component Value Date/Time    Cholesterol, total 169 10/14/2013 04:28 PM    HDL Cholesterol 39 (L) 10/14/2013 04:28 PM    LDL, calculated 75 10/14/2013 04:28 PM    Triglyceride 273 (H) 10/14/2013 04:28 PM     Lab Results   Component Value Date/Time    Creatinine 0.77 06/25/2020 06:37 AM     Lab Results   Component Value Date/Time    BUN 13 06/25/2020 06:37 AM     Lab Results   Component Value Date/Time    Potassium 4.0 06/25/2020 06:37 AM     No results found for: HBA1C, SCK6WSPA  Lab Results   Component Value Date/Time    HGB 12.4 06/25/2020 06:37 AM     Lab Results   Component Value Date/Time    PLATELET 581 55/10/3549 06:37 AM       Reviewed:  Past Medical History:   Diagnosis Date    Hyperlipidemia 7/30/2011    OCD (obsessive compulsive disorder) 7/30/2011    PSVT (paroxysmal supraventricular tachycardia) ([facility] 7/30/2011     Social History     Tobacco Use   Smoking Status Former Smoker   Smokeless Tobacco Never Used     Social History     Substance and Sexual Activity   Alcohol Use Yes    Comment: rarely     Allergies   Allergen Reactions    Pcn [Penicillins] Rash    Sulfa (Sulfonamide Antibiotics) Rash       Current Outpatient Medications   Medication Sig    LORazepam (ATIVAN) 1 mg tablet Take 1 mg by mouth every four (4) hours as needed for Anxiety.  diazePAM (VALIUM) 5 mg tablet TAKE ONE TABLET BY MOUTH EVERY EVENING (Patient taking differently: Take 2.5 mg by mouth daily.)    simvastatin (ZOCOR) 20 mg tablet TAKE ONE TABLET BY MOUTH EVERY NIGHT AT BEDTIME    digoxin (LANOXIN) 0.125 mg tablet TAKE ONE TABLET BY MOUTH DAILY    PARoxetine (PAXIL) 20 mg tablet TAKE ONE AND ONE-HALF TABLET BY MOUTH DAILY (Patient taking differently: Take 20 mg by mouth daily.)    timolol maleate 5 mg tablet TAKE ONE TABLET BY MOUTH DAILY (Patient taking differently: Take 2.5 mg by mouth daily.)    fish oil-dha-epa 1,200-144-216 mg cap Take 1 tablet by mouth four (4) times daily.  aspirin delayed-release 81 mg tablet Take 162 mg by mouth daily.  MULTIVIT WITH CALCIUM,IRON,MIN (WOMEN'S DAILY MULTIVITAMIN PO) Take 1 tablet by mouth daily.  Cholecalciferol, Vitamin D3, (VITAMIN D3) 1,000 unit cap Take 2,000 Units by mouth daily. No current facility-administered medications for this visit.         Jack Grey MD  [facility] [place]  [address] [facility] [address]  [address]

## 2020-07-13 ENCOUNTER — OFFICE VISIT (OUTPATIENT)
Dept: CARDIOLOGY CLINIC | Age: 65
End: 2020-07-13

## 2020-07-13 VITALS
WEIGHT: 118.4 LBS | HEIGHT: 66 IN | HEART RATE: 66 BPM | OXYGEN SATURATION: 97 % | RESPIRATION RATE: 16 BRPM | DIASTOLIC BLOOD PRESSURE: 60 MMHG | SYSTOLIC BLOOD PRESSURE: 120 MMHG | BODY MASS INDEX: 19.03 KG/M2

## 2020-07-13 DIAGNOSIS — R07.89 OTHER CHEST PAIN: Primary | ICD-10-CM

## 2020-07-13 DIAGNOSIS — I47.1 PSVT (PAROXYSMAL SUPRAVENTRICULAR TACHYCARDIA) (HCC): ICD-10-CM

## 2020-07-13 NOTE — LETTER
7/14/2020 9:54 AM 
 
Patient:  Evan Shannon YOB: 1955 Date of Visit: 7/13/2020 Dear Kristi Mendez MD 
[address] [place] [address] [address] VIA Facsimile: [phone]: 
 
 
Ms. Cierra Martinez is a 72 yo F with h/o reported paroxsymal SVT, had abnormal stress test in 1993 due to breast attenuation. Cardiac cath at that time noted no CAD. Reported question of mitral valve prolapse but echo studies have not confirmed this. Seen by cardiology Dr. Agustín Trent in past.  
 
She is here now due to episodes of chest discomfort. This can feel like a burning sensation from her chest to her back area lasting for several minutes at a time. She went to the emergency room back in June. She also saw GI and had an EGD on 06/23/2020 and she was told she had gastroesophageal reflux disease. She does note this chest discomfort is nonexertional.  It can happen, sometimes she wakes up with this, it can last for several minutes at a time. One time she was with her friend and they called the rescue squad and workup in the emergency room was unrevealing. There are some occasions where this radiates to her neck. Throughout these spells, she has never had shortness of breath. She just has rare palpitations. No lightheadedness or dizziness. Soc hx. Quit age 25 yo, 1ppd. I care for daughter who has similar symptoms. Fam hx. No early CAD. Assessment and Plan: 1. Chest pain. No obvious cardiac contribution. She does have a history of possible mitral valve prolapse and will obtain an echocardiogram for further evaluation. She had recent EGD and colonoscopy. Recommend she follow up with GI and primary care physician as well, as this could be GI or musculoskeletal.  It is not consistent with ischemic heart disease and no ischemic workup is indicated. If it was normal she can continue to follow here on an as needed basis. 2. Paroxysmal supraventricular tachycardia. Continues with palpitations. No changes made. 3. Bradycardia. Asymptomatic. 4. Mixed hyperlipidemia. Tolerating statin. 5. History of tobacco use. If you have questions, please do not hesitate to call me. Sincerely, Chauncey Jennings MD

## 2021-08-12 ENCOUNTER — TELEPHONE (OUTPATIENT)
Dept: CARDIOLOGY CLINIC | Age: 66
End: 2021-08-12

## 2021-08-12 NOTE — TELEPHONE ENCOUNTER
Returned call to patient. Two patient indentifiers verified. Pt stated that she was told at her follow up last year to double up on her fish oil. Pt was very concerned about just stopping because she stated that Dr. Morgan Browning told her to double up this is what has brought down her triglycerides. Pt asked that I double check with  that she really doesn't need to take fish oil anymore.

## 2021-08-12 NOTE — TELEPHONE ENCOUNTER
MD Melinda Doyle, EDUARDO  Caller: Unspecified (Today,  1:25 PM)  I would not recommend taking fish oil. There is evidence is that this won't hurt you, but may or may not be protective. I do not routinely recommend fish oil. Therefore, since there a question of side effect, I would not take fish oil.  thx

## 2021-08-12 NOTE — TELEPHONE ENCOUNTER
Patient requesting a call back. States she was advised to take 4 fish oils a day, but was never clarified what dosage should be. States she takes 1400 mg of Nature's Bounty fish oil and 980 mg of Omega 3. Patient states she has had 2 vomiting episode in the past 2 months and unsure what the cause was. States last episode was 08/07, states she was violently sick. Patient states she is unsure if it was food poisoning, but noticed the \"plastic fish oil capsules\" coming up. States she is unsure if the amount is too high or it is a digestive issue. States fish oil has been helping. States she would like to know if Dr. Kaye Padilla recommends and alternative fish oil.     Phone: [phone]

## 2021-08-13 NOTE — TELEPHONE ENCOUNTER
Patient returned call. Two patient indentifiers verified. Pt was informed of the message. Pt verbalized understanding and denies any further questions at this time.

## 2021-08-13 NOTE — TELEPHONE ENCOUNTER
MD Anali Fan RN  Caller: Unspecified (Yesterday,  1:25 PM)  I'd recommend red yeast rice then 1200 mg bid.  thx

## 2023-05-09 ENCOUNTER — TRANSCRIBE ORDERS (OUTPATIENT)
Facility: HOSPITAL | Age: 68
End: 2023-05-09

## 2023-05-09 DIAGNOSIS — Z12.31 SCREENING MAMMOGRAM FOR HIGH-RISK PATIENT: Primary | ICD-10-CM

## 2023-05-12 RX ORDER — LORAZEPAM 1 MG/1
TABLET ORAL EVERY 4 HOURS PRN
COMMUNITY

## 2023-05-12 RX ORDER — PAROXETINE HYDROCHLORIDE 20 MG/1
1.5 TABLET, FILM COATED ORAL DAILY
COMMUNITY
Start: 2017-04-27

## 2023-05-12 RX ORDER — DIGOXIN 125 MCG
1 TABLET ORAL DAILY
COMMUNITY
Start: 2017-05-07

## 2023-05-12 RX ORDER — DIAZEPAM 5 MG/1
1 TABLET ORAL NIGHTLY
COMMUNITY
Start: 2017-06-15

## 2023-05-12 RX ORDER — TIMOLOL MALEATE 5 MG/1
1 TABLET ORAL DAILY
COMMUNITY
Start: 2017-03-20

## 2023-05-12 RX ORDER — SIMVASTATIN 20 MG
1 TABLET ORAL NIGHTLY
COMMUNITY
Start: 2017-05-27

## 2023-05-12 RX ORDER — ASPIRIN 81 MG/1
TABLET ORAL DAILY
COMMUNITY

## 2023-05-17 ENCOUNTER — HOSPITAL ENCOUNTER (OUTPATIENT)
Facility: HOSPITAL | Age: 68
Discharge: HOME | End: 2023-05-20
Payer: MEDICARE

## 2023-05-17 DIAGNOSIS — Z12.31 SCREENING MAMMOGRAM FOR HIGH-RISK PATIENT: ICD-10-CM

## 2023-05-17 PROCEDURE — 77063 BREAST TOMOSYNTHESIS BI: CPT

## 2024-05-07 ENCOUNTER — TELEPHONE (OUTPATIENT)
Age: 69
End: 2024-05-07

## 2024-05-07 NOTE — TELEPHONE ENCOUNTER
Patient states that she is in [facility] for the next few days and she has been having rapid heart rate. She wants to know if  or nurse can send a bator blocker to the nearest pharmacy, she stated she took (timolol 5MG )in the past.    Phone [phone]

## 2024-05-07 NOTE — TELEPHONE ENCOUNTER
----- Message from Oniel Cherry MD sent at 5/7/2024 10:29 AM EDT -----  Probably can just go to urgent care. But it has been several years so I cannot prescribe things without seeing her. thx  ----- Message -----  From: Keyonna Ngo RN  Sent: 5/7/2024  10:25 AM EDT  To: Oniel Cherry MD    You haven't seen her since 7/13/2020. You want me to send her to the ER?

## 2024-05-07 NOTE — TELEPHONE ENCOUNTER
Telephone call made to patient. Two patient identifiers verified.   She said her hr is in the 80s. Her pcp has been prescribing it, but he's busy and he didn't get to her in time. She had questions regarding her timolol and switching to another beta blocker and said her cholesterol is pretty high. So she thinks it's a good idea to start seeing Dr. Cherry more regularly.     Future Appointments   Date Time Provider Department Center   5/15/2024  2:40 PM Oniel Cherry MD [facility]

## 2024-11-19 ENCOUNTER — TRANSCRIBE ORDERS (OUTPATIENT)
Facility: HOSPITAL | Age: 69
End: 2024-11-19

## 2024-11-19 DIAGNOSIS — Z12.31 VISIT FOR SCREENING MAMMOGRAM: Primary | ICD-10-CM

## 2024-12-17 ENCOUNTER — HOSPITAL ENCOUNTER (OUTPATIENT)
Facility: HOSPITAL | Age: 69
Discharge: HOME | End: 2024-12-20
Payer: MEDICARE

## 2024-12-17 VITALS — HEIGHT: 66 IN | WEIGHT: 117 LBS | BODY MASS INDEX: 18.8 KG/M2

## 2024-12-17 VITALS — WEIGHT: 117 LBS | HEIGHT: 66 IN | BODY MASS INDEX: 18.8 KG/M2

## 2024-12-17 DIAGNOSIS — Z12.31 VISIT FOR SCREENING MAMMOGRAM: ICD-10-CM

## 2024-12-17 DIAGNOSIS — Z78.0 ASYMPTOMATIC MENOPAUSAL STATE: ICD-10-CM

## 2024-12-17 PROCEDURE — 77080 DXA BONE DENSITY AXIAL: CPT

## 2024-12-17 PROCEDURE — 77063 BREAST TOMOSYNTHESIS BI: CPT

## (undated) DEVICE — FORCEPS BX L240CM JAW DIA2.8MM L CAP W/ NDL MIC MESH TOOTH

## (undated) DEVICE — SNARE ENDOSCP M L240CM W27MM SHTH DIA2.4MM CHN 2.8MM OVL

## (undated) DEVICE — TRAP SURG QUAD PARABOLA SLOT DSGN SFTY SCRN TRAPEASE

## (undated) DEVICE — TUBING HYDR IRR --